# Patient Record
Sex: FEMALE | Race: WHITE | NOT HISPANIC OR LATINO | Employment: FULL TIME | URBAN - METROPOLITAN AREA
[De-identification: names, ages, dates, MRNs, and addresses within clinical notes are randomized per-mention and may not be internally consistent; named-entity substitution may affect disease eponyms.]

---

## 2019-11-07 ENCOUNTER — HOSPITAL ENCOUNTER (EMERGENCY)
Facility: CLINIC | Age: 44
Discharge: HOME | End: 2019-11-07
Attending: EMERGENCY MEDICINE | Admitting: EMERGENCY MEDICINE
Payer: COMMERCIAL

## 2019-11-07 ENCOUNTER — APPOINTMENT (OUTPATIENT)
Dept: GENERAL RADIOLOGY | Facility: CLINIC | Age: 44
End: 2019-11-07
Attending: EMERGENCY MEDICINE
Payer: COMMERCIAL

## 2019-11-07 VITALS
WEIGHT: 125.22 LBS | SYSTOLIC BLOOD PRESSURE: 125 MMHG | RESPIRATION RATE: 22 BRPM | HEIGHT: 62 IN | BODY MASS INDEX: 23.04 KG/M2 | TEMPERATURE: 97.8 F | HEART RATE: 75 BPM | OXYGEN SATURATION: 100 % | DIASTOLIC BLOOD PRESSURE: 85 MMHG

## 2019-11-07 DIAGNOSIS — R07.9 CHEST PAIN, UNSPECIFIED TYPE: ICD-10-CM

## 2019-11-07 DIAGNOSIS — M79.622 PAIN OF LEFT UPPER ARM: ICD-10-CM

## 2019-11-07 LAB
ANION GAP SERPL CALCULATED.3IONS-SCNC: 5 MMOL/L (ref 3–14)
BUN SERPL-MCNC: 11 MG/DL (ref 7–30)
CALCIUM SERPL-MCNC: 8.4 MG/DL (ref 8.5–10.1)
CHLORIDE SERPL-SCNC: 109 MMOL/L (ref 94–109)
CO2 SERPL-SCNC: 25 MMOL/L (ref 20–32)
CREAT SERPL-MCNC: 0.87 MG/DL (ref 0.52–1.04)
ERYTHROCYTE [DISTWIDTH] IN BLOOD BY AUTOMATED COUNT: 12.5 % (ref 10–15)
GFR SERPL CREATININE-BSD FRML MDRD: 80 ML/MIN/{1.73_M2}
GLUCOSE SERPL-MCNC: 85 MG/DL (ref 70–99)
HCG SERPL QL: NEGATIVE
HCT VFR BLD AUTO: 40.9 % (ref 35–47)
HGB BLD-MCNC: 13.7 G/DL (ref 11.7–15.7)
INTERPRETATION ECG - MUSE: NORMAL
MCH RBC QN AUTO: 31.6 PG (ref 26.5–33)
MCHC RBC AUTO-ENTMCNC: 33.5 G/DL (ref 31.5–36.5)
MCV RBC AUTO: 94 FL (ref 78–100)
PLATELET # BLD AUTO: 287 10E9/L (ref 150–450)
POTASSIUM SERPL-SCNC: 3.8 MMOL/L (ref 3.4–5.3)
RBC # BLD AUTO: 4.34 10E12/L (ref 3.8–5.2)
SODIUM SERPL-SCNC: 139 MMOL/L (ref 133–144)
TROPONIN I SERPL-MCNC: <0.015 UG/L (ref 0–0.04)
WBC # BLD AUTO: 5.7 10E9/L (ref 4–11)

## 2019-11-07 PROCEDURE — 93005 ELECTROCARDIOGRAM TRACING: CPT

## 2019-11-07 PROCEDURE — 84703 CHORIONIC GONADOTROPIN ASSAY: CPT | Performed by: EMERGENCY MEDICINE

## 2019-11-07 PROCEDURE — 80048 BASIC METABOLIC PNL TOTAL CA: CPT | Performed by: EMERGENCY MEDICINE

## 2019-11-07 PROCEDURE — 99285 EMERGENCY DEPT VISIT HI MDM: CPT | Mod: 25

## 2019-11-07 PROCEDURE — 71046 X-RAY EXAM CHEST 2 VIEWS: CPT

## 2019-11-07 PROCEDURE — 85027 COMPLETE CBC AUTOMATED: CPT | Performed by: EMERGENCY MEDICINE

## 2019-11-07 PROCEDURE — 84484 ASSAY OF TROPONIN QUANT: CPT | Performed by: EMERGENCY MEDICINE

## 2019-11-07 ASSESSMENT — ENCOUNTER SYMPTOMS
SHORTNESS OF BREATH: 0
NAUSEA: 0
COUGH: 0
VOMITING: 0
MYALGIAS: 1
NUMBNESS: 1
WEAKNESS: 0

## 2019-11-07 ASSESSMENT — MIFFLIN-ST. JEOR: SCORE: 1171.25

## 2019-11-07 NOTE — ED AVS SNAPSHOT
[facility]  [address]  [facility] [address]  Phone:  [phone]  Fax:  [phone]                                    Ania Block   MRN: [id number]    Department:  [facility]   Date of Visit:  11/7/2019           After Visit Summary Signature Page    I have received my discharge instructions, and my questions have been answered. I have discussed any challenges I see with this plan with the nurse or doctor.    ..........................................................................................................................................  Patient/Patient Representative Signature      ..........................................................................................................................................  Patient Representative Print Name and Relationship to Patient    ..................................................               ................................................  Date                                   Time    ..........................................................................................................................................  Reviewed by Signature/Title    ...................................................              ..............................................  Date                                               Time          22EPIC Rev 08/18

## 2019-11-07 NOTE — ED TRIAGE NOTES
Pt c/o left arm pain for 4 days. Worse in morning. Pt states pain is worse today. Shooting down arm. Pain in axilla. Denies chest pain. Denies injury.

## 2019-11-07 NOTE — LETTER
November 7, 2019      To Whom It May Concern:      Ania Block was seen in our Emergency Department today, 11/07/19.  I  Sincerely,        Garcia Ramirez RN

## 2019-11-07 NOTE — ED PROVIDER NOTES
History     Chief Complaint:  Arm Pain    HPI  Ania Block is a 43 year old female who presents to the emergency department today for evaluation of arm pain. The patient reports a sudden onset of left arm pain upon waking up 4 days ago. She describes the pain as being in the left chest/armpit area that radiates up into the shoulder and down the arm. She reports associated intermittent numbness of the arm as well. The pain has not lessened in severity but is waxing and waning. Nothing makes the pain better or worse. She has not taken any medications for the pain. No shortness of breath, cough, leg swelling, nausea, vomiting, weakness, rashes, or any other symptoms.    PE/DVT RISK FACTORS:  Sex:    Female  Hormones:   Yes  Tobacco:   No  Cancer:   No  Travel:   No  Surgery:   No  Other immobilization: No  Personal history:  No  Family history:  No    Allergies:  Latex    Medications:    Advair diskus inhaler  Dupixent injection  Junel  Valtrex  Albuterol solution  Celexa  Albuterol inhaler   Epipen  Xiidra 5%    Past Medical History:    Eczema  Asthma  Chronic right-sided low back pain with sciatica  Anxiety  Nephrolithiasis  Osteopenia of multiple sites  Recurrent oral herpes simplex  Atopic dermatitis  Diffuse cystic mastopathy  Allergic rhinitis  Fibrocystic breast    Past Surgical History:    Right breast biopsy    Family History:    Allergies  Asthma  Cancer  Eczema  Osteoporosis  Leukemia   Thyroid disorder  Amblyopia/strabismus     Social History:  Smoking status: Never  Alcohol use: Yes  PCP: [facility], [place]  Marital Status:     Review of Systems   Respiratory: Negative for cough and shortness of breath.    Cardiovascular: Positive for chest pain. Negative for leg swelling.   Gastrointestinal: Negative for nausea and vomiting.   Musculoskeletal: Positive for myalgias (left arm).   Skin: Negative for rash.   Neurological: Positive for numbness. Negative for weakness.   All other  "systems reviewed and are negative.      Physical Exam     Patient Vitals for the past 24 hrs:   BP Temp Pulse Heart Rate Resp SpO2 Height Weight   11/07/19 0830 123/80 -- 81 82 22 -- -- --   11/07/19 0825 -- -- -- 86 13 -- -- --   11/07/19 0820 -- -- 89 -- -- 100 % -- --   11/07/19 0815 (!) 147/87 -- -- -- -- -- -- --   11/07/19 0805 (!) 135/97 97.8  F (36.6  C) 78 -- 22 100 % 1.575 m (5' 2\") 56.8 kg (125 lb 3.5 oz)     Physical Exam    General:   Pleasant, age appropriate female.  HEENT:    Oropharynx is moist, without lesions or trismus.  Eyes:    Conjunctiva normal  Neck:    Supple, no meningismus.       Full ROM     Negative Spurling's sign   CV:     Regular rate and rhythm.      No murmurs, rubs or gallops.       No unilateral leg swelling.       2+ radial pulses bilateral.       No lower extremity edema.  PULM:    Clear to auscultation bilateral.       No respiratory distress.      Good air exchange.     No rales or wheezing.     No stridor.  ABD:    Soft, non-tender, non-distended.       No pulsatile masses.       No rebound, guarding or rigidity.  MSK:     No gross deformity to all four extremities.      LUE:      No joint effusions, rash or erythema      Mild pain with resisted flexion and extension of the elbow      Compartments are soft and compressible  LYMPH:   No cervical lymphadenopathy.  NEURO:   Alert. Good muscle tone, no atrophy.     Strength 5/5 in upper extremities and sensation intact     LUE:      Axillary, radial, median and ulnar nerve intact  Skin:    Warm, dry and intact.    Psych:    Mildly anxious    Emergency Department Course     ECG:  ECG taken at 0830, ECG read at 0835  Normal sinus rhythm.  Normal ECG  Rate 75 bpm. DC interval 174 ms. QRS duration 78 ms. QT/QTc 352/393 ms. P-R-T axes 64 65 57.    Imaging:  Radiology findings were communicated with the patient who voiced understanding of the findings.    Chest XR,  PA & LAT  Preliminary Result  IMPRESSION: No radiographic evidence of " acute chest abnormality.   Results per radiology.    Laboratory:  Laboratory findings were communicated with the patient who voiced understanding of the findings.    CBC: WBC 5.7, HGB 13.7,   BMP: Calcium 8.4 (L) o/w WNL (Creatinine 0.87)  0827 - Troponin: <0.015  HCG qual: Negative    Emergency Department Course:  Past medical records, nursing notes, and vitals reviewed.  0815: I performed an exam of the patient and obtained history, as documented above. GCS 15.    IV inserted and blood drawn.    The patient was sent for a xray while in the emergency department, findings above.    0902: I rechecked the patient. Findings and plan explained to the Patient. Patient discharged home with instructions regarding supportive care, medications, and reasons to return. The importance of close follow-up was reviewed.     Impression & Plan      Medical Decision Making:  Ania Block is a 43 year old female presents the ED with complaints of left-sided chest pain and arm pain.  On examination, she does not have findings to suggest cervical radiculopathy.  She was evaluated for ACS.  EKG reveals no ischemic changes.  Troponin is within normal limits despite greater than 24 hours of constant symptoms thus ruling out MI.  She has no findings concerning for pulmonary embolus.  She does have reproducible pain with resisted motion of left upper extremity indicating likely musculoskeletal source.  Patient safe for discharge home with anti-inflammatories and close follow-up with primary care physician for further investigation if symptoms persist.    Diagnosis:    ICD-10-CM    1. Chest pain, unspecified type R07.9 Basic metabolic panel (BMP)     Troponin I   2. Pain of left upper arm M79.622        Disposition:   Discharged.      Scribe Disclosure:  Amanda WATSON, am serving as a scribe at 8:15 AM on 11/7/2019 to document services personally performed by Tony Spencer MD based on my observations and the  provider's statements to me.    [facility] EMERGENCY DEPARTMENT       Tony Spencer MD  11/07/19 0941

## 2022-06-25 ENCOUNTER — HOSPITAL ENCOUNTER (EMERGENCY)
Facility: CLINIC | Age: 47
Discharge: HOME | End: 2022-06-25
Attending: EMERGENCY MEDICINE | Admitting: EMERGENCY MEDICINE
Payer: COMMERCIAL

## 2022-06-25 VITALS
OXYGEN SATURATION: 96 % | WEIGHT: 118 LBS | RESPIRATION RATE: 15 BRPM | TEMPERATURE: 98 F | DIASTOLIC BLOOD PRESSURE: 65 MMHG | BODY MASS INDEX: 21.58 KG/M2 | SYSTOLIC BLOOD PRESSURE: 120 MMHG | HEART RATE: 80 BPM

## 2022-06-25 DIAGNOSIS — T78.40XA ALLERGIC REACTION, INITIAL ENCOUNTER: ICD-10-CM

## 2022-06-25 PROCEDURE — 99283 EMERGENCY DEPT VISIT LOW MDM: CPT

## 2022-06-25 PROCEDURE — 250N000013 HC RX MED GY IP 250 OP 250 PS 637: Performed by: EMERGENCY MEDICINE

## 2022-06-25 PROCEDURE — 250N000011 HC RX IP 250 OP 636: Performed by: EMERGENCY MEDICINE

## 2022-06-25 RX ORDER — FAMOTIDINE 10 MG
10 TABLET ORAL ONCE
Status: COMPLETED | OUTPATIENT
Start: 2022-06-25 | End: 2022-06-25

## 2022-06-25 RX ORDER — CETIRIZINE HYDROCHLORIDE 10 MG/1
10 TABLET ORAL ONCE
Status: COMPLETED | OUTPATIENT
Start: 2022-06-25 | End: 2022-06-25

## 2022-06-25 RX ORDER — EPINEPHRINE 0.3 MG/.3ML
0.3 INJECTION SUBCUTANEOUS PRN
Qty: 0.6 ML | Refills: 1 | Status: SHIPPED | OUTPATIENT
Start: 2022-06-25

## 2022-06-25 RX ORDER — DEXAMETHASONE 4 MG/1
8 TABLET ORAL ONCE
Status: COMPLETED | OUTPATIENT
Start: 2022-06-25 | End: 2022-06-25

## 2022-06-25 RX ORDER — EPINEPHRINE 0.3 MG/.3ML
0.3 INJECTION SUBCUTANEOUS PRN
Qty: 0.6 ML | Refills: 1 | Status: SHIPPED | OUTPATIENT
Start: 2022-06-25 | End: 2022-06-25

## 2022-06-25 RX ADMIN — FAMOTIDINE 10 MG: 10 TABLET ORAL at 20:58

## 2022-06-25 RX ADMIN — DEXAMETHASONE 8 MG: 4 TABLET ORAL at 20:58

## 2022-06-25 RX ADMIN — CETIRIZINE HYDROCHLORIDE 10 MG: 10 TABLET, FILM COATED ORAL at 20:58

## 2022-06-25 ASSESSMENT — ENCOUNTER SYMPTOMS
FACIAL SWELLING: 1
SHORTNESS OF BREATH: 1

## 2022-06-26 NOTE — DISCHARGE INSTRUCTIONS
Please make an appointment to follow up with your primary care provider in 3-4 days if not improving.      Return to ER immediately if you develop: worsening breathing, feeling throat closing, Fever > 101, persistent nausea or vomiting OR you have any other concerns about your health.      Continue anti histamines for next 3 days: H1 blockers (Benadryl or Allegra or other over the counter allergy medicine) AS WELL AS H2 blocker (Zantac)        Use EPIPEN as needed for new shortness of breath or feeling throat closing    Discharge Instructions  Allergic Reaction    An allergic reaction can result in a rash, itching, swelling, watery eyes, or a runny nose. A serious reaction can cause swelling of your mouth or throat, or wheezing. The most serious allergy is called analphylaxis, and can be life-threatening. Many allergies result in hives, also called urticaria.     An allergy happens when the body s natural defense system (immune system) overreacts to something harmless. The thing that triggers your allergic reaction is called an allergen. The first time you are exposed to your allergen, you may not have any reaction, but the body makes a protein called an antibody. The antibody lets the body recognize and remember the allergen.  Every time you are exposed to your allergen you get more antibody and your reaction can be more severe.      Call 911 if you have:  Swelling of the lips, tongue or throat.  Hoarse voice, drooling or trouble breathing.  Chest pain or shortness of breath.  Fainting or unconsciousness.    Return to the Emergency Department if:  You develop a fever.  You have significant abdominal pain.  You vomit more than once.  Your rash changes or looks very different.    What can I do to help myself?  If you know what caused your allergy, don t touch it, throw any of it away, and tell others not to have it around you. Wear a medical alert bracelet with a name of your allergen on it.  If you don t know what  you are allergic to, keep a journal of everything that you are exposed to (foods, soaps, medicines, etc.). Take this with you when you follow up with your primary doctor. This may help determine what is causing the allergic reaction.  Take any medicines that are prescribed.  Antihistamines can decrease rash or itching. You may use Benadryl  (diphenhydramine) for rash or itching according to package directions, or use a prescription antihistamine as recommended by your physician.  For significant allergic reactions, you may have been given an epinephrine (adrenaline) auto injector (EpiPen ). Carry this with you at all times! Use it if you are having any symptoms of anaphylaxis.  Do not be afraid to use it. Always call 911 if you use your EpiPen ! It is only meant to buy time until you can get to the Emergency Department!  If you were given a prescription for medicine here today, be sure to read all of the information (including the package insert) that comes with your prescription.  This will include important information about the medicine, its side effects, and any warnings that you need to know about.  The pharmacist who fills the prescription can provide more information and answer questions you may have about the medicine.  If you have questions or concerns that the pharmacist cannot address, please call or return to the Emergency Department.       Remember that you can always come back to the Emergency Department if you are not able to see your regular doctor in the amount of time listed above, if you get any new symptoms, or if there is anything that worries you.

## 2022-06-26 NOTE — ED TRIAGE NOTES
Pt has known tree nut allergy and thinks a cookie she ate at 7 pm maybe the cause. Pt has lip swelling and hives to face.

## 2022-06-26 NOTE — ED PROVIDER NOTES
History   Chief Complaint:  Allergic Reaction       The history is provided by the patient.      Ania Block is a 46 year old female, otherwise healthy, who presents with hives and swelling to her face due to an allergic reaction after eating a cookie approximately an hour ago. She reports the reaction to have onset within a few minutes after eating as she began to itch her face. She took 2 benadryl (12.5 mg) with no change in symptoms, prompting her arrival to the ED.  The patient has a history of allergy to nuts. She denies anaphylaxis but experiences slight dyspnea, which she suspects may be due to anxiety.    Review of Systems   HENT: Positive for facial swelling.    Respiratory: Positive for shortness of breath.    Skin: Positive for rash.        (+) hives   All other systems reviewed and are negative.        Allergies:  Nuts    Medications:  Valacyclovir  Epinephrine  Albuterol  Methotrexate  Prednisone   Dupixent  Lexapro    Past Medical History:     Chronic right-sided low back pain  Dermatitis   Anxiety  Nephrolithiasis  Osteopenia   Cystic mastopathy  Asthma  Breast cyst    Past Surgical History:    Bx breast, bilateral     Family History:    Allergies  Asthma  Cancer  Eczema  Osteoporosis  Leukemia  Thyroid disorder    Social History:  The patient presents alone.  The patient presents in a private vehicle.   PCP: [facility], [place]    Physical Exam     Patient Vitals for the past 24 hrs:   BP Temp Temp src Pulse Resp SpO2 Weight   06/25/22 2145 -- -- -- 80 15 96 % --   06/25/22 2130 -- -- -- 86 14 97 % --   06/25/22 2122 -- -- -- 84 10 98 % --   06/25/22 2121 120/65 -- -- 77 -- -- --   06/25/22 2051 134/82 98  F (36.7  C) Oral 94 20 100 % 53.5 kg (118 lb)       Physical Exam  Gen: Anxious  HENT: Mild upper and lower lip angioedema, no lingual edema, no sublingual or posterior oropharyngeal angioedema mmm, no rhinorrhea  Eyes: periorbital tissues and sclera normal   Neck: supple, no  abnormal swelling, no stridor  Lungs:  CTAB,  no resp distress  CV: rrr, no m/r/g, ppi  Abd: soft, nontender, nondistended, no rebound/masses/guarding/hsm  Ext: no peripheral edema  Skin: Blanching urticarial lesions present on the face and neck.  Neuro: alert, MAEE, no gross motor or sensory deficits, gait stable  Psych: Normal mood, normal affect      Emergency Department Course     Emergency Department Course:    Reviewed:  I reviewed nursing notes, vitals, past medical history and Care Everywhere    Assessments:  2049 I obtained history and examined the patient as noted above.   2245 I rechecked the patient. I believe that they are safe for discharge at this time.     Interventions:  2058 Cetirizine 10 mg PO  2058 Famotidine 10 mg PO  2058 Dexamethasone 8 mg PO    Disposition:  The patient was discharged to home.     Impression & Plan     Medical Decision Making:  Urticaria and mild lip angioedema after eating a cookie.  Did well with antihistamines and time here in the ED not requiring epinephrine administration.  Single dose of Decadron.  Discharge home return if new or worsening symptoms.  Agree with the plan.    Diagnosis:    ICD-10-CM    1. Allergic reaction, initial encounter  T78.40XA        Discharge Medications:  Current Discharge Medication List      START taking these medications    Details   EPINEPHrine (ANY BX GENERIC EQUIV) 0.3 MG/0.3ML injection 2-pack Inject 0.3 mLs (0.3 mg) into the muscle as needed for anaphylaxis  Qty: 0.6 mL, Refills: 1             Scribe Disclosure:  I, Rachana Angel, am serving as a scribe at 8:49 PM on 6/25/2022 to document services personally performed by Seth Tellez MD based on my observations and the provider's statements to me.          Seth Tellez MD  06/26/22 1119